# Patient Record
Sex: FEMALE | Race: WHITE | NOT HISPANIC OR LATINO | Employment: OTHER | URBAN - METROPOLITAN AREA
[De-identification: names, ages, dates, MRNs, and addresses within clinical notes are randomized per-mention and may not be internally consistent; named-entity substitution may affect disease eponyms.]

---

## 2017-02-09 ENCOUNTER — OFFICE VISIT (OUTPATIENT)
Dept: OBSTETRICS AND GYNECOLOGY | Facility: CLINIC | Age: 57
End: 2017-02-09

## 2017-02-09 VITALS
WEIGHT: 200 LBS | HEIGHT: 66 IN | SYSTOLIC BLOOD PRESSURE: 144 MMHG | DIASTOLIC BLOOD PRESSURE: 88 MMHG | BODY MASS INDEX: 32.14 KG/M2

## 2017-02-09 DIAGNOSIS — N81.10 BLADDER PROLAPSE, FEMALE, ACQUIRED: ICD-10-CM

## 2017-02-09 DIAGNOSIS — Z01.419 GYNECOLOGIC EXAM NORMAL: Primary | ICD-10-CM

## 2017-02-09 PROCEDURE — 99396 PREV VISIT EST AGE 40-64: CPT | Performed by: NURSE PRACTITIONER

## 2017-02-09 RX ORDER — IBUPROFEN 200 MG
200 TABLET ORAL EVERY 6 HOURS
COMMUNITY
End: 2022-05-03

## 2017-02-09 RX ORDER — KETOCONAZOLE 20 MG/G
CREAM TOPICAL
COMMUNITY
Start: 2017-02-06 | End: 2018-11-20

## 2017-02-09 RX ORDER — FEXOFENADINE HCL AND PSEUDOEPHEDRINE HCI 60; 120 MG/1; MG/1
1 TABLET, EXTENDED RELEASE ORAL
COMMUNITY
End: 2022-05-03

## 2017-02-09 RX ORDER — TERBINAFINE HYDROCHLORIDE 250 MG/1
TABLET ORAL
COMMUNITY
Start: 2017-02-06 | End: 2020-07-23

## 2017-02-09 RX ORDER — FLUTICASONE PROPIONATE 50 MCG
1 SPRAY, SUSPENSION (ML) NASAL
COMMUNITY
Start: 2014-11-04

## 2017-02-09 RX ORDER — SPIRONOLACTONE 25 MG
1 TABLET ORAL
COMMUNITY
End: 2020-07-23

## 2017-02-09 RX ORDER — OMEPRAZOLE 40 MG/1
40 CAPSULE, DELAYED RELEASE ORAL
COMMUNITY
Start: 2015-07-29 | End: 2022-05-03

## 2017-02-09 NOTE — PATIENT INSTRUCTIONS
Pt. Counseled today on safe sex practices, pap smear performed, self breast examinations discussed, if positive family history mammogram starting at age 35 otherwise starting at age 40. Colonoscopy recommended.  Hormone replacement therapy discussed. Aspirin prophylaxis to reduce risk of stroke.  Calcium and Vitamin D requirements discussed.  Diet and exercise also counseled. Pt had colonoscopy recently. Needs mammagram. Discussed having dr. gurrola evaluate her prolapse

## 2017-02-09 NOTE — PROGRESS NOTES
Tiffanie Grayson is a 57 y.o. female.   Chief Complaint   Patient presents with   • Gynecologic Exam     annual, discuss bladder issues      HPI:pt here for annual gyn exam, states she cah feel her bladder bulging out    The following portions of the patient's history were reviewed and updated as appropriate: allergies, current medications, past family history, past medical history, past social history, past surgical history and problem list.    Review of Systems  Review of Systems   Constitutional: Negative.  Negative for unexpected weight change.   Respiratory: Negative for chest tightness and shortness of breath.    Cardiovascular: Negative for chest pain and palpitations.   Gastrointestinal: Negative for abdominal pain and blood in stool.   Endocrine: Negative.    Genitourinary: Negative for dyspareunia, dysuria, frequency, hematuria, menstrual problem, pelvic pain, vaginal bleeding, vaginal discharge and vaginal pain.   Musculoskeletal: Negative for joint swelling.   Skin: Negative for color change, rash and wound.   Allergic/Immunologic: Negative.    Psychiatric/Behavioral: Negative.    All other systems reviewed and are negative.      Objective   Physical Exam   Constitutional: She is oriented to person, place, and time. She appears well-developed and well-nourished.   HENT:   Head: Normocephalic.   Neck: Normal range of motion.   Cardiovascular: Normal rate and regular rhythm.    Pulmonary/Chest: Effort normal and breath sounds normal. Right breast exhibits no mass and no nipple discharge. Left breast exhibits no mass and no nipple discharge. There is no breast swelling.   Breasts soft without palpable masses   Abdominal: Soft. Bowel sounds are normal.   Genitourinary: Vagina normal. There is no rash or lesion on the right labia. There is no rash or lesion on the left labia. Cervix exhibits no friability. Right adnexum displays no mass, no tenderness and no fullness. Left adnexum displays no mass, no tenderness  and no fullness.   Genitourinary Comments: Ovaries  Within normal limits. Cervix     Neurological: She is alert and oriented to person, place, and time.   Skin: Skin is warm and dry.   Psychiatric: She has a normal mood and affect. Her behavior is normal.   Vitals reviewed.      Assessment/Plan   Patient Instructions   Pt. Counseled today on safe sex practices, pap smear performed, self breast examinations discussed, if positive family history mammogram starting at age 35 otherwise starting at age 40. Colonoscopy recommended.  Hormone replacement therapy discussed. Aspirin prophylaxis to reduce risk of stroke.  Calcium and Vitamin D requirements discussed.  Diet and exercise also counseled. Pt had colonoscopy recently. Needs mammagram. Discussed having dr. gurrola evaluate her prolapse      Tiffanie was seen today for gynecologic exam.    Diagnoses and all orders for this visit:    Gynecologic exam normal  -     Pap IG, Rfx HPV ASCU    Bladder prolapse, female, acquired  -     Ambulatory Referral to Gynecology        Return in about 1 year (around 2/9/2018).

## 2017-02-13 LAB
CONV .: NORMAL
CYTOLOGIST CVX/VAG CYTO: NORMAL
CYTOLOGY CVX/VAG DOC THIN PREP: NORMAL
DX ICD CODE: NORMAL
HIV 1 & 2 AB SER-IMP: NORMAL
OTHER STN SPEC: NORMAL
PATH REPORT.FINAL DX SPEC: NORMAL
STAT OF ADQ CVX/VAG CYTO-IMP: NORMAL

## 2018-11-20 ENCOUNTER — OFFICE VISIT (OUTPATIENT)
Dept: OBSTETRICS AND GYNECOLOGY | Facility: CLINIC | Age: 58
End: 2018-11-20

## 2018-11-20 VITALS
HEIGHT: 66 IN | DIASTOLIC BLOOD PRESSURE: 76 MMHG | BODY MASS INDEX: 30.25 KG/M2 | WEIGHT: 188.2 LBS | SYSTOLIC BLOOD PRESSURE: 114 MMHG

## 2018-11-20 DIAGNOSIS — N64.59 BREAST COMPLAINT: ICD-10-CM

## 2018-11-20 DIAGNOSIS — Z01.419 WELL WOMAN EXAM WITH ROUTINE GYNECOLOGICAL EXAM: Primary | ICD-10-CM

## 2018-11-20 PROCEDURE — 99396 PREV VISIT EST AGE 40-64: CPT | Performed by: NURSE PRACTITIONER

## 2018-11-20 PROCEDURE — 99213 OFFICE O/P EST LOW 20 MIN: CPT | Performed by: NURSE PRACTITIONER

## 2018-11-20 RX ORDER — PSEUDOEPHEDRINE HCL 30 MG
30 TABLET ORAL EVERY 4 HOURS PRN
COMMUNITY
End: 2020-07-23

## 2018-11-20 NOTE — PROGRESS NOTES
[facility], [place], operated by [facility]  Routine Annual Visit    2018    Patient: Tiffanie Grayson          MR#:[id number]      History of Present Illness    58 y.o. female  who presents for annual exam. Pap negative 2017. Mammogram benign 3/2017. Hx hysterectomy years ago for cervical dysplasia per Tiffanie. She saw Dr. Martinez last year for cystocele and underwent DAVINCI LAPAROSCOPIC SACRAL COLPOPEXY, LYSIS OF ADHESIONS (N/A ); DAVINCI SALPINGO-OOPHORECTOMY (Bilateral ); MIDURETHRAL SLING. She reports good results with this surgery. She reports she is up to date on colonoscopy. Her only complaint today is intermittent itching of her left nipple- states does seem to be resolving. She denies skin changes, swelling, lumps, nipple discharge bilaterally.     No LMP recorded. Patient has had a hysterectomy.  Obstetric History:  OB History      Para Term  AB Living    3 3 3     3    SAB TAB Ectopic Molar Multiple Live Births                        Menstrual History:     No LMP recorded. Patient has had a hysterectomy.       Sexual History:       ________________________________________  There is no problem list on file for this patient.      Past Medical History:   Diagnosis Date   • GERD (gastroesophageal reflux disease)    • Osteoarthritis (arthritis due to wear and tear of joints)        Past Surgical History:   Procedure Laterality Date   • BACK SURGERY     • BLADDER NECK SUSPENSION  2017   • BREAST AUGMENTATION     • CHOLECYSTECTOMY     • HYSTERECTOMY     • NECK SURGERY     • OOPHORECTOMY  2017   • ROTATOR CUFF REPAIR         Social History     Tobacco Use   Smoking Status Never Smoker       Family History   Problem Relation Age of Onset   • Melanoma Father    • Stroke Mother    • Heart disease Mother    • Thyroid cancer Mother        Prior to Admission medications    Medication Sig Start Date End Date Taking? Authorizing Provider   fexofenadine-pseudoephedrine (ALLEGRA-D)  MG per 12 hr tablet Take  1 tablet by mouth.   Yes Tika Andrade MD   fluticasone (FLONASE) 50 MCG/ACT nasal spray 1 spray into each nostril. 11/4/14  Yes Tika Andrade MD   ibuprofen (ADVIL,MOTRIN) 200 MG tablet Take 200 mg by mouth Every 6 (Six) Hours.   Yes Tika Andrade MD   Lutein 20 MG capsule Take 1 tablet by mouth.   Yes Tika Andrade MD   Multiple Vitamins-Minerals (MULTIVITAMIN PO) Take 1 tablet by mouth.   Yes Tika Andrade MD   MULTIPLE VITAMINS-MINERALS ER PO Take 1 tablet by mouth.   Yes Tika Andrade MD   omeprazole (priLOSEC) 40 MG capsule Take 40 mg by mouth. 7/29/15  Yes Tika Andrade MD   pseudoephedrine (SUDAFED) 30 MG tablet Take 30 mg by mouth Every 4 (Four) Hours As Needed for Congestion.   Yes Tika Andrade MD   terbinafine (lamiSIL) 250 MG tablet  2/6/17  Yes Tika Andrade MD   diclofenac (VOLTAREN) 1 % gel gel Place 4 g on the skin. 9/9/15 11/20/18  Tika Andrade MD   ketoconazole (NIZORAL) 2 % cream  2/6/17 11/20/18  Tika Andrade MD   Vortioxetine HBr (TRINTELLIX) 10 MG tablet  10/4/16 11/20/18  Tika Andrade MD     ________________________________________  The following portions of the patient's history were reviewed and updated as appropriate: allergies, current medications, past family history, past medical history, past social history, past surgical history and problem list.    Review of Systems   Constitutional: Negative.    HENT: Negative.    Eyes: Negative for visual disturbance.   Respiratory: Negative for cough, shortness of breath and wheezing.    Cardiovascular: Negative for chest pain, palpitations and leg swelling.   Gastrointestinal: Negative for abdominal distention, abdominal pain, blood in stool, constipation, diarrhea, nausea and vomiting.   Endocrine: Negative for cold intolerance and heat intolerance.   Genitourinary: Negative for difficulty urinating, dyspareunia, dysuria, frequency, genital sores,  "hematuria, menstrual problem, pelvic pain, urgency, vaginal bleeding, vaginal discharge and vaginal pain.   Musculoskeletal: Negative.    Skin: Negative.    Neurological: Negative for dizziness, weakness, light-headedness, numbness and headaches.   Hematological: Negative.    Psychiatric/Behavioral: Negative.    Breast- See HPI      Objective   Physical Exam    /76   Ht 167.6 cm (66\")   Wt 85.4 kg (188 lb 3.2 oz)   BMI 30.38 kg/m²    BP Readings from Last 3 Encounters:   11/20/18 114/76   02/09/17 144/88      Wt Readings from Last 3 Encounters:   11/20/18 85.4 kg (188 lb 3.2 oz)   02/09/17 90.7 kg (200 lb)        BMI: Estimated body mass index is 30.38 kg/m² as calculated from the following:    Height as of this encounter: 167.6 cm (66\").    Weight as of this encounter: 85.4 kg (188 lb 3.2 oz).            General:   alert, appears stated age and cooperative   Heart: regular rate and rhythm, S1, S2 normal, no murmur, click, rub or gallop   Lungs: clear to auscultation bilaterally   Abdomen: soft, non-tender, without masses or organomegaly   Breast: inspection negative, no nipple discharge or bleeding, no masses or nodularity palpable   Vulva: normal   Vagina: normal mucosa, normal discharge   Cervix: absent   Uterus: absent    Adnexa: exam limited by habitus     Assessment:    1. Normal annual exam   2. Healthy lifestyle modifications discussed, counseled on self breast exams and bone health  3. Left nipple itching- dx bilateral mammogram ordered; pt to call for persistent symptoms even if imaging returns negative. She should expect a call regarding scheduling within the next several days.    Plan:    []  Rx:   [x]  Mammogram request made  [x]  PAP done  []  Occult fecal blood test (Insure)  []  Labs:   []  GC/Chl/TV  []  DEXA scan   []  Referral for colonoscopy:           SARI Alcocer  11/20/2018 11:08 AM  "

## 2018-11-21 DIAGNOSIS — Z12.31 VISIT FOR SCREENING MAMMOGRAM: Primary | ICD-10-CM

## 2018-11-26 ENCOUNTER — TELEPHONE (OUTPATIENT)
Dept: OBSTETRICS AND GYNECOLOGY | Facility: CLINIC | Age: 58
End: 2018-11-26

## 2018-11-26 NOTE — TELEPHONE ENCOUNTER
Spoke with pt informed of normal pap. Stated understanding. SM  ----- Message from SARI Becerra sent at 11/26/2018 11:08 AM EST -----  Please inform patient her pap smear returned negative (normal). Thank you.

## 2018-12-13 DIAGNOSIS — N64.59 BREAST COMPLAINT: ICD-10-CM

## 2020-02-20 ENCOUNTER — TELEPHONE (OUTPATIENT)
Dept: OBSTETRICS AND GYNECOLOGY | Facility: CLINIC | Age: 60
End: 2020-02-20

## 2020-02-20 DIAGNOSIS — Z12.39 SCREENING FOR BREAST CANCER: Primary | ICD-10-CM

## 2020-02-20 NOTE — TELEPHONE ENCOUNTER
Called patient no answer left vm letting her no order was put in and to give me a call back to get that scheduled.

## 2020-02-20 NOTE — TELEPHONE ENCOUNTER
Order placed. If she wants to get it at our office she does not need an order, but I seen the last was at [facility] (order can be faxed if she prefers that location). Thanks!

## 2020-02-20 NOTE — TELEPHONE ENCOUNTER
Former Jerica jean bpatiste patient switching to you. Her annual is schedule for 07/23  And she is wanting to know if she could have a order for a mammogram put in piror to her appointment because she has been having breast pain.

## 2020-02-25 DIAGNOSIS — N64.4 BREAST PAIN: Primary | ICD-10-CM

## 2020-03-18 ENCOUNTER — TELEPHONE (OUTPATIENT)
Dept: OBSTETRICS AND GYNECOLOGY | Facility: CLINIC | Age: 60
End: 2020-03-18

## 2020-03-18 DIAGNOSIS — N64.4 BREAST PAIN: ICD-10-CM

## 2020-03-18 NOTE — TELEPHONE ENCOUNTER
----- Message from Esther Barba MD sent at 3/18/2020 11:55 AM EDT -----  Please call patient and let her know that her mammogram results were benign.  If her breast pain persists, I would recommend an appointment to evaluate.  If she has any questions, I am happy to answer them. Thanks!    03/18/20  Called patient to inform results, no answer. Left a message to return call.

## 2020-07-23 ENCOUNTER — OFFICE VISIT (OUTPATIENT)
Dept: OBSTETRICS AND GYNECOLOGY | Facility: CLINIC | Age: 60
End: 2020-07-23

## 2020-07-23 VITALS
DIASTOLIC BLOOD PRESSURE: 76 MMHG | HEART RATE: 87 BPM | SYSTOLIC BLOOD PRESSURE: 109 MMHG | HEIGHT: 66 IN | WEIGHT: 196 LBS | BODY MASS INDEX: 31.5 KG/M2

## 2020-07-23 DIAGNOSIS — Z01.419 WELL WOMAN EXAM WITH ROUTINE GYNECOLOGICAL EXAM: Primary | ICD-10-CM

## 2020-07-23 PROBLEM — M65.30 TRIGGER FINGER: Status: ACTIVE | Noted: 2018-11-02

## 2020-07-23 PROBLEM — K63.5 COLON POLYP: Status: ACTIVE | Noted: 2020-07-23

## 2020-07-23 PROBLEM — N95.2 ATROPHIC VAGINITIS: Status: ACTIVE | Noted: 2017-03-18

## 2020-07-23 PROBLEM — IMO0002 DDD (DEGENERATIVE DISC DISEASE): Status: ACTIVE | Noted: 2020-07-23

## 2020-07-23 PROBLEM — M20.41 HAMMERTOE OF RIGHT FOOT: Status: ACTIVE | Noted: 2019-09-12

## 2020-07-23 PROBLEM — N39.3 FEMALE STRESS INCONTINENCE: Status: ACTIVE | Noted: 2017-03-18

## 2020-07-23 PROBLEM — M77.41 METATARSALGIA OF RIGHT FOOT: Status: ACTIVE | Noted: 2019-09-12

## 2020-07-23 PROBLEM — IMO0002 CYSTOCELE: Status: ACTIVE | Noted: 2017-03-16

## 2020-07-23 PROCEDURE — 99396 PREV VISIT EST AGE 40-64: CPT | Performed by: OBSTETRICS & GYNECOLOGY

## 2020-07-23 RX ORDER — VIT C/B6/B5/MAGNESIUM/HERB 173 50-5-6-5MG
CAPSULE ORAL
COMMUNITY
End: 2022-05-03

## 2020-07-23 RX ORDER — ATORVASTATIN CALCIUM 10 MG/1
TABLET, FILM COATED ORAL
COMMUNITY
Start: 2020-04-04

## 2020-07-23 RX ORDER — SODIUM PHOSPHATE,MONO-DIBASIC 19G-7G/118
ENEMA (ML) RECTAL
COMMUNITY

## 2020-07-23 NOTE — PROGRESS NOTES
Chief Complaint   Patient presents with   • Annual Exam     pap:  NILM (no hpv test), mammo: , colonoscopy: 2-3 yrs ago.         Tiffanie Grayson is a 60 y.o.  who presents for an annual examination     Pap history:  Last pap: 2018 NIL,   Prior abnormal paps: yes, had hysterectomy in  for dysplasia, no h/o cancer. Did have LEEP prior to hysterectomy.  All normal since.   DXA:  no, denies parent with hip fracture, denies fragility fracture, no h/o long term steroid use, denies smoking, denies drinking three or more alcoholic beverages, denies thyroid disease/dm/RA.    Colonoscopy:  yes, 2-3 years ago  Mammogram: 2020, breast pain gone  STDs  Sexually active: yes, with   Menopause:  Age: at time of hysterectomy in   Bleeding since? no  Vasomotor symptoms: no  HRT: no  Dyspareunia: yes - tried gel in the past (estrace or premarin) but stopped due to fear of side effects      Is patient's family or personal history significant for any risks for BRCA? no    Past Medical History:   Diagnosis Date   • GERD (gastroesophageal reflux disease)    • Osteoarthritis (arthritis due to wear and tear of joints)      Past Surgical History:   Procedure Laterality Date   • BACK SURGERY     • BLADDER NECK SUSPENSION  2017   • BREAST AUGMENTATION     • CHOLECYSTECTOMY     • HYSTERECTOMY      for cervical dysplasia   • NECK SURGERY     • OOPHORECTOMY  2017   • ROTATOR CUFF REPAIR     • SHOULDER ROTATOR CUFF REPAIR       OB History    Para Term  AB Living   4 3 3   1 3   SAB TAB Ectopic Molar Multiple Live Births   1         3      # Outcome Date GA Lbr Phillip/2nd Weight Sex Delivery Anes PTL Lv   4 SAB      SAB      3 Term      Vag-Spont   RADHA   2 Term      Vag-Spont   RADHA   1 Term      Vag-Spont   RADHA     Social History     Tobacco Use   • Smoking status: Never Smoker   • Smokeless tobacco: Never Used   Substance Use Topics   • Alcohol use: Yes     Comment: rarely   • Drug use: No      Family History   Problem Relation Age of Onset   • Melanoma Father    • Stroke Mother    • Heart disease Mother    • Thyroid cancer Mother    • Breast cancer Neg Hx    • Ovarian cancer Neg Hx    • Uterine cancer Neg Hx    • Colon cancer Neg Hx    • Pulmonary embolism Neg Hx    • Deep vein thrombosis Neg Hx      Current Outpatient Medications on File Prior to Visit   Medication Sig Dispense Refill   • ASHWAGANDHA PO Take  by mouth.     • atorvastatin (LIPITOR) 10 MG tablet TAKE 1 TABLET BY MOUTH EVERY DAY AT NIGHT     • BIOTIN PO Take  by mouth.     • CALCIUM PO Take  by mouth.     • fexofenadine-pseudoephedrine (ALLEGRA-D)  MG per 12 hr tablet Take 1 tablet by mouth.     • fluticasone (FLONASE) 50 MCG/ACT nasal spray 1 spray into each nostril.     • glucosamine-chondroitin 500-400 MG capsule capsule Take  by mouth 3 (Three) Times a Day With Meals.     • ibuprofen (ADVIL,MOTRIN) 200 MG tablet Take 200 mg by mouth Every 6 (Six) Hours.     • Multiple Vitamins-Minerals (MULTIVITAMIN PO) Take 1 tablet by mouth.     • MULTIPLE VITAMINS-MINERALS ER PO Take 1 tablet by mouth.     • omeprazole (priLOSEC) 40 MG capsule Take 40 mg by mouth.     • SUPER B COMPLEX/C PO Take  by mouth.     • Turmeric 500 MG capsule Take  by mouth.     • [DISCONTINUED] Lutein 20 MG capsule Take 1 tablet by mouth.     • [DISCONTINUED] pseudoephedrine (SUDAFED) 30 MG tablet Take 30 mg by mouth Every 4 (Four) Hours As Needed for Congestion.     • [DISCONTINUED] terbinafine (lamiSIL) 250 MG tablet        No current facility-administered medications on file prior to visit.      Allergies   Allergen Reactions   • Adhesive Tape Rash     SEVERE BLISTERING OF SKIN, ESPECIALLY WITH TEGADERM   • Codeine Nausea And Vomiting   • Hydrocodone Nausea Only   • Oxycodone Nausea And Vomiting        Review of Systems   Constitutional: Negative.    HENT: Negative.    Respiratory: Negative.    Cardiovascular: Negative.    Gastrointestinal: Negative.   "  Endocrine: Negative.    Genitourinary: Negative.    Musculoskeletal: Negative.    Skin: Negative.    Neurological: Negative.    Psychiatric/Behavioral: Negative.        OBJECTIVE:   Vitals:    07/23/20 1103   BP: 109/76   Pulse: 87   Weight: 88.9 kg (196 lb)   Height: 167.6 cm (66\")      Physical Exam   Constitutional: She is oriented to person, place, and time. She appears well-developed and well-nourished. No distress.   HENT:   Head: Normocephalic and atraumatic.   Eyes: EOM are normal. No scleral icterus.   Neck: Normal range of motion. Neck supple. No thyromegaly present.   Cardiovascular: Normal rate and regular rhythm. Exam reveals no gallop and no friction rub.   No murmur heard.  Pulmonary/Chest: Effort normal and breath sounds normal. No respiratory distress. She has no wheezes. She has no rales. She exhibits no tenderness. Right breast exhibits no inverted nipple. Left breast exhibits no inverted nipple. No breast swelling. Breasts are symmetrical.   Abdominal: Soft. Bowel sounds are normal. She exhibits no distension. There is no tenderness. There is no guarding.   Genitourinary: Vagina normal. There is no rash, tenderness, lesion, injury or Bartholin's cyst on the right labia. There is no rash, tenderness, lesion, injury or Bartholin's cyst on the left labia. Uterus is absent.   Cervix is absent. No vaginal discharge found.   Musculoskeletal: She exhibits no edema or deformity.   Neurological: She is alert and oriented to person, place, and time.   Skin: Skin is warm and dry. She is not diaphoretic.   Psychiatric: She has a normal mood and affect. Her speech is normal and behavior is normal. Judgment and thought content normal. Cognition and memory are normal.       ASSESSMENT/PLAN:  Annual well woman visit:  Cervical cancer screening:    Reports remote h/o cervical dysplasia   Screening guidelines discussed with patient. We reviewed that given no h/o cancer and it has been over 20 years, reasonable " to defer screening.  Breast cancer screening:    Mammogram UTD   Breast self awareness encouraged  Colonscopy:   UTD  DXA   Due at 64yo, no identifiable early risks  Family history    does not demonstrate need for genetics referral   Healthy lifestyle counseling:   attempt to lose weight and return for routine annual checkups    BMI Counseling  Her BMI is classified as BMI 30.0-34.9        Classification: class I obesity.    Encouraged weight loss, had lost 20lbs a few years ago and would like to get back to that weight.     Dyspareunia:   Would like to try non hormonal products first. Provided list of OTC products

## 2022-05-03 ENCOUNTER — OFFICE VISIT (OUTPATIENT)
Dept: OBSTETRICS AND GYNECOLOGY | Facility: CLINIC | Age: 62
End: 2022-05-03

## 2022-05-03 VITALS
SYSTOLIC BLOOD PRESSURE: 120 MMHG | WEIGHT: 184 LBS | HEIGHT: 66 IN | BODY MASS INDEX: 29.57 KG/M2 | DIASTOLIC BLOOD PRESSURE: 67 MMHG

## 2022-05-03 DIAGNOSIS — Z78.0 POSTMENOPAUSE: ICD-10-CM

## 2022-05-03 DIAGNOSIS — Z01.419 WOMEN'S ANNUAL ROUTINE GYNECOLOGICAL EXAMINATION: Primary | ICD-10-CM

## 2022-05-03 PROCEDURE — 99396 PREV VISIT EST AGE 40-64: CPT | Performed by: NURSE PRACTITIONER

## 2022-05-03 RX ORDER — IBUPROFEN 200 MG
200 TABLET ORAL
COMMUNITY

## 2022-05-03 RX ORDER — GABAPENTIN 300 MG/1
300 CAPSULE ORAL 3 TIMES DAILY
COMMUNITY
Start: 2021-09-23 | End: 2022-09-23

## 2022-05-03 RX ORDER — MULTIVIT WITH MINERALS/LUTEIN
250 TABLET ORAL DAILY
COMMUNITY

## 2022-05-03 RX ORDER — FAMOTIDINE 20 MG/1
20 TABLET, FILM COATED ORAL 2 TIMES DAILY PRN
COMMUNITY
Start: 2022-04-14

## 2022-05-03 RX ORDER — MAGNESIUM 30 MG
30 TABLET ORAL 2 TIMES DAILY
COMMUNITY

## 2022-05-03 RX ORDER — ACETAMINOPHEN 500 MG
1000 TABLET ORAL
COMMUNITY

## 2022-05-03 NOTE — PROGRESS NOTES
GYN Annual Exam     Chief Complaint   Patient presents with   • Gynecologic Exam     Annual exam - last pap 2018 negative, MG 2020, colonoscopy 5-6 years ago.       Tiffanie Grayson is a 62 y.o. female who presents for annual well woman exam. Prior hysterectomy. Hx dysplasia and LEEP. No hx cancer. Normal pap smears since hysterectomy. She denies vaginal spotting or discharge. She completes SBE monthly. No prior Dexa scan. She is on calcium and vitamin D. Reports recent labs with low vitamin D and starting on prescription vitamin D. No risk factors for osteoporosis.     This is my first time meeting Tiffanie Grayson  She is a patient of Dr. Leal. She was last seen 2020  OB History        4    Para   3    Term   3            AB   1    Living   3       SAB   1    IAB        Ectopic        Molar        Multiple        Live Births   3                Mammogram: 2022  Dexa scan: never, no risk factors   Colonoscopy: 5-6 years ago, has upcoming appt to repeat   Last Pap : 2018 negative   History of abnormal Pap smear: yes - see above  Family history of uterine, colon or ovarian cancer: no  Family history of breast cancer: no  History of abnormal mammogram: yes - f/u WNL    Menopause:  Bleeding since? no  Vasomotor symptoms: yes, intermittent   HRT: no  Dyspareunia:mild dryness, using lubricants       Past Medical History:   Diagnosis Date   • GERD (gastroesophageal reflux disease)    • Osteoarthritis (arthritis due to wear and tear of joints)        Past Surgical History:   Procedure Laterality Date   • BACK SURGERY     • BLADDER NECK SUSPENSION  2017   • BREAST AUGMENTATION     • CHOLECYSTECTOMY     • HYSTERECTOMY      for cervical dysplasia   • NECK SURGERY     • OOPHORECTOMY  2017   • ROTATOR CUFF REPAIR     • SHOULDER ROTATOR CUFF REPAIR           Current Outpatient Medications:   •  acetaminophen (TYLENOL) 500 MG tablet, Take 1,000 mg by mouth., Disp: , Rfl:   •  atorvastatin (LIPITOR) 10 MG  tablet, TAKE 1 TABLET BY MOUTH EVERY DAY AT NIGHT, Disp: , Rfl:   •  BIOTIN PO, Take  by mouth., Disp: , Rfl:   •  CALCIUM PO, Take  by mouth., Disp: , Rfl:   •  Coenzyme Q10 10 MG capsule, Take  by mouth., Disp: , Rfl:   •  famotidine (PEPCID) 20 MG tablet, Take 20 mg by mouth 2 (Two) Times a Day As Needed., Disp: , Rfl:   •  fluticasone (FLONASE) 50 MCG/ACT nasal spray, 1 spray into each nostril., Disp: , Rfl:   •  gabapentin (NEURONTIN) 300 MG capsule, Take 300 mg by mouth 3 (Three) Times a Day., Disp: , Rfl:   •  glucosamine-chondroitin 500-400 MG capsule capsule, Take  by mouth 3 (Three) Times a Day With Meals., Disp: , Rfl:   •  ibuprofen (ADVIL,MOTRIN) 200 MG tablet, Take 200 mg by mouth., Disp: , Rfl:   •  magnesium 30 MG tablet, Take 30 mg by mouth 2 (Two) Times a Day., Disp: , Rfl:   •  Pseudoephedrine HCl  MG tablet sustained-release 24 hour, Take  by mouth., Disp: , Rfl:   •  SUPER B COMPLEX/C PO, Take  by mouth., Disp: , Rfl:   •  vitamin C (ASCORBIC ACID) 250 MG tablet, Take 250 mg by mouth Daily., Disp: , Rfl:   •  vitamin D3 125 MCG (5000 UT) capsule capsule, Take 5,000 Units by mouth Daily., Disp: , Rfl:   •  Zinc Sulfate (ZINC 15 PO), Take  by mouth., Disp: , Rfl:     Allergies   Allergen Reactions   • Adhesive Tape Rash     SEVERE BLISTERING OF SKIN, ESPECIALLY WITH TEGADERM   • Codeine Nausea And Vomiting   • Hydrocodone Nausea Only   • Oxycodone Nausea And Vomiting       Social History     Tobacco Use   • Smoking status: Never Smoker   • Smokeless tobacco: Never Used   Substance Use Topics   • Alcohol use: Yes     Comment: rarely   • Drug use: No       Family History   Problem Relation Age of Onset   • Melanoma Father    • Stroke Mother    • Heart disease Mother    • Thyroid cancer Mother    • Breast cancer Neg Hx    • Ovarian cancer Neg Hx    • Uterine cancer Neg Hx    • Colon cancer Neg Hx    • Pulmonary embolism Neg Hx    • Deep vein thrombosis Neg Hx        Review of Systems  "  Constitutional: Negative for chills, fatigue and fever.   Gastrointestinal: Negative for abdominal distention, abdominal pain, nausea and vomiting.        +hemrrhoids    Endocrine: Positive for heat intolerance (mild). Negative for cold intolerance.   Genitourinary: Negative for dyspareunia, dysuria, pelvic pain, vaginal bleeding, vaginal discharge and vaginal pain.   Musculoskeletal: Negative for gait problem.   Skin: Negative for rash.   Neurological: Negative for dizziness and headaches.   Hematological: Does not bruise/bleed easily.   Psychiatric/Behavioral: Negative for behavioral problems.       /67   Ht 167.6 cm (66\")   Wt 83.5 kg (184 lb)   BMI 29.70 kg/m²     Physical Exam  Constitutional:       Appearance: Normal appearance.   Genitourinary:      Vulva, bladder and urethral meatus normal.      No lesions in the vagina.      Right Labia: No rash, tenderness, lesions, skin changes or Bartholin's cyst.     Left Labia: No tenderness, lesions, skin changes, Bartholin's cyst or rash.     No labial fusion noted.      No inguinal adenopathy present in the right or left side.     Vaginal cuff intact.     No vaginal discharge, erythema, tenderness, bleeding or ulceration.      No vaginal prolapse present.     Mild vaginal atrophy present.       Right Adnexa: not tender, not full, not palpable, no mass present and not absent.     Left Adnexa: not tender, not full, not palpable, no mass present and not absent.     Cervix is absent.      Uterus is absent.      No urethral tenderness or mass present.      Pelvic exam was performed with patient in the lithotomy position.   Rectum:      External hemorrhoid present.   Breasts: Breasts are symmetrical.      Right: Present. No swelling, bleeding, inverted nipple, mass, nipple discharge, skin change, tenderness, breast implant, axillary adenopathy or supraclavicular adenopathy.      Left: Present. No swelling, bleeding, inverted nipple, mass, nipple discharge, skin " change, tenderness, breast implant, axillary adenopathy or supraclavicular adenopathy.       HENT:      Head: Normocephalic and atraumatic.   Eyes:      Pupils: Pupils are equal, round, and reactive to light.   Cardiovascular:      Rate and Rhythm: Normal rate.   Pulmonary:      Effort: Pulmonary effort is normal.   Abdominal:      General: There is no distension.      Palpations: Abdomen is soft. There is no mass.      Tenderness: There is no abdominal tenderness. There is no guarding.      Hernia: No hernia is present. There is no hernia in the left inguinal area or right inguinal area.   Musculoskeletal:         General: Normal range of motion.      Cervical back: Normal range of motion and neck supple. No tenderness.   Lymphadenopathy:      Cervical: No cervical adenopathy.      Upper Body:      Right upper body: No supraclavicular, axillary or pectoral adenopathy.      Left upper body: No supraclavicular, axillary or pectoral adenopathy.      Lower Body: No right inguinal adenopathy. No left inguinal adenopathy.   Neurological:      General: No focal deficit present.      Mental Status: She is alert and oriented to person, place, and time.      Cranial Nerves: No cranial nerve deficit.   Skin:     General: Skin is warm and dry.   Psychiatric:         Mood and Affect: Mood normal.         Behavior: Behavior normal.         Thought Content: Thought content normal.         Judgment: Judgment normal.   Vitals and nursing note reviewed.       Assessment    Diagnoses and all orders for this visit:    1. Women's annual routine gynecological examination (Primary)  -     IGP, Apt HPV,rfx 16 / 18,45    2. Postmenopause       Plan     1) Breast Health - Clinical breast exam & mammogram yearly, Self breast awareness. Schedule screening mammogram.   2) Pap - collected  3) STD-no  4) Smoking status- nonsmoker  5) Colon health - screening colonoscopy recommended if not up to date  6) Overweight by BMI (29.70)  7) Bone health  - Weight bearing exercise, dietary calcium recommendations and vitamin D  reviewed.   8) Encouraged 150 minutes of exercise per week if not medically contraindicated    Follow up prn and one year    Adelina Fajardo, APRN  5/3/2022  12:07 EDT

## 2022-05-09 LAB
CYTOLOGIST CVX/VAG CYTO: NORMAL
CYTOLOGY CVX/VAG DOC CYTO: NORMAL
CYTOLOGY CVX/VAG DOC THIN PREP: NORMAL
DX ICD CODE: NORMAL
HIV 1 & 2 AB SER-IMP: NORMAL
HPV I/H RISK 4 DNA CVX QL PROBE+SIG AMP: NEGATIVE
OTHER STN SPEC: NORMAL
STAT OF ADQ CVX/VAG CYTO-IMP: NORMAL